# Patient Record
Sex: FEMALE | URBAN - METROPOLITAN AREA
[De-identification: names, ages, dates, MRNs, and addresses within clinical notes are randomized per-mention and may not be internally consistent; named-entity substitution may affect disease eponyms.]

---

## 2017-11-28 DIAGNOSIS — E03.9 HYPOTHYROIDISM, UNSPECIFIED TYPE: ICD-10-CM

## 2017-11-28 NOTE — LETTER
[facility]  [address] [id number] [phone]            Carine Moy  [address] [facility] [address]        November 29, 2017    Dear Carine,    While refilling your prescription today, we noticed that you are due for an appointment with your provider.  We will refill your prescription for 30 days, but a follow-up appointment must be made before any additional refills can be approved.     Taking care of your health is important to us and we look forward to seeing you in the near future.  Please call us at [phone] or [phone] (or use "[employer]") to schedule an appointment.     Please disregard this notice if you have already made an appointment.    Sincerely,        [facility]

## 2017-11-29 RX ORDER — LEVOTHYROXINE SODIUM 75 UG/1
TABLET ORAL
Qty: 30 TABLET | Refills: 0 | Status: SHIPPED | OUTPATIENT
Start: 2017-11-29 | End: 2017-12-21

## 2017-11-29 NOTE — TELEPHONE ENCOUNTER
Medication is being filled for 1 time refill only due to:  Patient needs to be seen because it has been more than one year since last visit.  Letter sent

## 2017-12-21 DIAGNOSIS — E03.9 HYPOTHYROIDISM, UNSPECIFIED TYPE: ICD-10-CM

## 2017-12-21 RX ORDER — LEVOTHYROXINE SODIUM 75 UG/1
TABLET ORAL
Qty: 30 TABLET | Refills: 0 | Status: SHIPPED | OUTPATIENT
Start: 2017-12-21 | End: 2017-12-24

## 2017-12-22 ENCOUNTER — OFFICE VISIT (OUTPATIENT)
Dept: INTERNAL MEDICINE | Facility: CLINIC | Age: 42
End: 2017-12-22
Payer: COMMERCIAL

## 2017-12-22 VITALS
TEMPERATURE: 98.7 F | SYSTOLIC BLOOD PRESSURE: 134 MMHG | HEIGHT: 61 IN | BODY MASS INDEX: 27.64 KG/M2 | OXYGEN SATURATION: 100 % | WEIGHT: 146.4 LBS | DIASTOLIC BLOOD PRESSURE: 80 MMHG | HEART RATE: 72 BPM

## 2017-12-22 DIAGNOSIS — E03.9 HYPOTHYROIDISM, UNSPECIFIED TYPE: Primary | ICD-10-CM

## 2017-12-22 LAB
T4 FREE SERPL-MCNC: 1.28 NG/DL (ref 0.76–1.46)
TSH SERPL DL<=0.005 MIU/L-ACNC: 0.71 MU/L (ref 0.4–4)

## 2017-12-22 PROCEDURE — 84443 ASSAY THYROID STIM HORMONE: CPT | Performed by: INTERNAL MEDICINE

## 2017-12-22 PROCEDURE — 84439 ASSAY OF FREE THYROXINE: CPT | Performed by: INTERNAL MEDICINE

## 2017-12-22 PROCEDURE — 36415 COLL VENOUS BLD VENIPUNCTURE: CPT | Performed by: INTERNAL MEDICINE

## 2017-12-22 PROCEDURE — 99213 OFFICE O/P EST LOW 20 MIN: CPT | Performed by: INTERNAL MEDICINE

## 2017-12-22 NOTE — NURSING NOTE
"Chief Complaint   Patient presents with     Thyroid Problem     Follow up and refill on levo.       Initial /80 (BP Location: Left arm, Patient Position: Chair)  Pulse 72  Temp 98.7  F (37.1  C) (Oral)  Ht 5' 1.3\" (1.557 m)  Wt 146 lb 6.4 oz (66.4 kg)  LMP 11/29/2017 (Exact Date)  SpO2 100%  BMI 27.39 kg/m2 Estimated body mass index is 27.39 kg/(m^2) as calculated from the following:    Height as of this encounter: 5' 1.3\" (1.557 m).    Weight as of this encounter: 146 lb 6.4 oz (66.4 kg).  Medication Reconciliation: complete     Kaminibose MA      "

## 2017-12-22 NOTE — PROGRESS NOTES
"  SUBJECTIVE:                                                      HPI: Carine Schumacher is a pleasant 42 year old female who presents for hypothyroidism follow-up:     utilized.    HYPOTHYROID FOLLOW-UP                                                      Current regimen: 75 mcg daily.  Adherent to regimen: Yes  Taking on an empty stomach with sip of water - do food, drink, supplements, or medications for at least 45 minutes: Yes    Cold or heat intolerance: Denies  Weight gain or loss: Denies  Hair loss or skin changes (dry or oily): Denies  Excessive fatigue: Denies  Constipation or diarrhea: Denies  Mood changes (anxiety or depression): Denies    ---    Patient is also DUE for flu shot - declines.    Patient is also DUE for Pap smear - will return at a future date to have this performed.    ---    The medication, allergy, and problem lists have been reviewed and updated as appropriate.       OBJECTIVE:                                                      /80 (BP Location: Left arm, Patient Position: Chair)  Pulse 72  Temp 98.7  F (37.1  C) (Oral)  Ht 5' 1.3\" (1.557 m)  Wt 146 lb 6.4 oz (66.4 kg)  LMP 11/29/2017 (Exact Date)  SpO2 100%  BMI 27.39 kg/m2  Constitutional: well-appearing  Psych: normal judgment and insight; normal mood and affect; recent and remote memory intact      ASSESSMENT/PLAN:                                                      (E03.9) Hypothyroidism, unspecified type  (primary encounter diagnosis)  Plan: TFTs - today refills to follow.    The instructions on the AVS were discussed and explained to the patient. Patient expressed understanding of instructions.    (Chart documentation was completed, in part, with [employer] voice-recognition software. Even though reviewed, some grammatical, spelling, and word errors may remain.)    Jumana Cui MD   [address] [address]  T: [phone], F: " [phone]

## 2017-12-22 NOTE — LETTER
12/24/17      Carine Schumacher  [address] [facility] [address]        Dear Ms.Lara Schumacher,    It was a pleasure seeing you again the other day! I hope this finds you well.    I wanted to let you know that your thyroid labs came back as normal.    Based on your results, I have sent a refill for levothyroxine to your pharmacy,     Please feel free to contact me with any questions or concerns.      Take care,    Jumana Cui MD   [place] [address] [place] [address]  T: [phone], F: [phone]

## 2017-12-22 NOTE — MR AVS SNAPSHOT
"              After Visit Summary   2017    Carine Schumacher    MRN: [id number]           Patient Information     Date Of Birth          1975        Visit Information        Provider Department      2017 10:00 AM [facility]umana Cui [facility]        Today's Diagnoses     Hypothyroidism, unspecified type    -  1      Care Instructions    Please schedule pap smear on the way out!    --    Labs today - refills to follow.           Follow-ups after your visit        Who to contact     If you have questions or need follow up information about today's clinic visit or your schedule please contact [facility] directly at [phone].  Normal or non-critical lab and imaging results will be communicated to you by MyChart, letter or phone within 4 business days after the clinic has received the results. If you do not hear from us within 7 days, please contact the clinic through MyChart or phone. If you have a critical or abnormal lab result, we will notify you by phone as soon as possible.  Submit refill requests through [employer] or call your pharmacy and they will forward the refill request to us. Please allow 3 business days for your refill to be completed.          Additional Information About Your Visit        MyChart Information     [employer] lets you send messages to your doctor, view your test results, renew your prescriptions, schedule appointments and more. To sign up, go to [email or web address] . Click on \"Log in\" on the left side of the screen, which will take you to the Welcome page. Then click on \"Sign up Now\" on the right side of the page.     You will be asked to enter the access code listed below, as well as some personal information. Please follow the directions to create your username and password.     Your access code is: [email or web address]  Expires: 3/22/2018 10:19 AM     Your access code will  in 90 days. If you need help or " "a new code, please call your [place] clinic or [phone].        Care EveryWhere ID     This is your Care EveryWhere ID. This could be used by other organizations to access your [place] medical records  [id number]        Your Vitals Were     Pulse Temperature Height Last Period Pulse Oximetry BMI (Body Mass Index)    72 98.7  F (37.1  C) (Oral) 5' 1.3\" (1.557 m) 11/29/2017 (Exact Date) 100% 27.39 kg/m2       Blood Pressure from Last 3 Encounters:   12/22/17 134/80   11/18/16 118/70    Weight from Last 3 Encounters:   12/22/17 146 lb 6.4 oz (66.4 kg)   11/18/16 144 lb 4.8 oz (65.5 kg)              We Performed the Following     T4 free     TSH        Primary Care Provider Office Phone # Fax #    Jumana Cui [phone]       [address] [facility] [address]        Equal Access to Services     MIKAELA [facility] HospitalELIANA : Hadii aad ku hadasho Soomaali, waaxda luqadaha, qaybta kaalmada adeegyada, waxay shruthiin haydeepak kaur . So [facility] [phone].    ATENCIÓN: Si habla español, tiene a leigh disposición servicios gratuitos de asistencia lingüística. Llame al [phone].    We comply with applicable federal civil rights laws and [place] laws. We do not discriminate on the basis of race, color, national origin, age, disability, sex, sexual orientation, or gender identity.            Thank you!     Thank you for choosing [facility]  for your care. Our goal is always to provide you with excellent care. Hearing back from our patients is one way we can continue to improve our services. Please take a few minutes to complete the written survey that you may receive in the mail after your visit with us. Thank you!             Your Updated Medication List - Protect others around you: Learn how to safely use, store and throw away your medicines at www.disposemymeds.org.          This list is accurate as of: 12/22/17 10:19 AM.  Always use your most recent med list.          "          Brand Name Dispense Instructions for use Diagnosis    levothyroxine 75 MCG tablet    SYNTHROID/LEVOTHROID    30 tablet    TAKE 1 TABLET BY MOUTH ONCE DAILY    Hypothyroidism, unspecified type

## 2017-12-24 DIAGNOSIS — E03.9 HYPOTHYROIDISM, UNSPECIFIED TYPE: ICD-10-CM

## 2017-12-24 RX ORDER — LEVOTHYROXINE SODIUM 75 UG/1
75 TABLET ORAL DAILY
Qty: 90 TABLET | Refills: 3 | Status: SHIPPED | OUTPATIENT
Start: 2017-12-24 | End: 2018-12-13

## 2018-01-07 ENCOUNTER — HEALTH MAINTENANCE LETTER (OUTPATIENT)
Age: 43
End: 2018-01-07

## 2018-02-16 ENCOUNTER — OFFICE VISIT (OUTPATIENT)
Dept: INTERNAL MEDICINE | Facility: CLINIC | Age: 43
End: 2018-02-16
Payer: COMMERCIAL

## 2018-02-16 VITALS
DIASTOLIC BLOOD PRESSURE: 80 MMHG | HEART RATE: 70 BPM | OXYGEN SATURATION: 100 % | WEIGHT: 148.4 LBS | HEIGHT: 61 IN | BODY MASS INDEX: 28.02 KG/M2 | SYSTOLIC BLOOD PRESSURE: 122 MMHG | TEMPERATURE: 98.2 F

## 2018-02-16 DIAGNOSIS — J31.0 CHRONIC RHINITIS, UNSPECIFIED TYPE: Primary | ICD-10-CM

## 2018-02-16 DIAGNOSIS — H11.31 SUBCONJUNCTIVAL HEMORRHAGE OF RIGHT EYE: ICD-10-CM

## 2018-02-16 DIAGNOSIS — R09.81 NASAL CONGESTION: ICD-10-CM

## 2018-02-16 DIAGNOSIS — H10.13 ALLERGIC CONJUNCTIVITIS, BILATERAL: ICD-10-CM

## 2018-02-16 PROCEDURE — 99213 OFFICE O/P EST LOW 20 MIN: CPT | Performed by: PHYSICIAN ASSISTANT

## 2018-02-16 RX ORDER — FLUTICASONE PROPIONATE 50 MCG
1-2 SPRAY, SUSPENSION (ML) NASAL DAILY
Qty: 1 BOTTLE | Refills: 11 | Status: SHIPPED | OUTPATIENT
Start: 2018-02-16 | End: 2019-04-12

## 2018-02-16 NOTE — MR AVS SNAPSHOT
After Visit Summary   2/16/2018    Carine Schumacher    MRN: [id number]           Patient Information     Date Of Birth          1975        Visit Information        Provider Department      2/16/2018 12:45 PM Lucero Cox PA-C; [facility]        Today's Diagnoses     Chronic rhinitis, unspecified type    -  1    Allergic conjunctivitis, bilateral        Subconjunctival hemorrhage of right eye        Nasal congestion          Care Instructions      Conjuntivitis, Alérgica [Conjunctivitis, Allergic]    La conjuntivitis alérgica (Allergic Conjunctivitis) es yaneth reacción al polvo o el polen que pudiese anil en el aire. Provoca comezón y enrojecimiento en las membranas de los párpados. También puede presentarse hinchazón de los párpados, enrojecimiento y la sensación tener arena o algo áspero dentro del jarrod.  Cuidados En [place]:  1. Es posible que le receten gotas para los ojos (eye drops) a fin de aliviar la comezón y el enrojecimiento. Úselas según le hayan indicado. Si no le recetaron gotas, puede usar Visine, Vasocon o algunas otras gotas descongestionantes para los ojos (decongestant eye drops), que leigh ann de venta sin receta.  2. Coloque yaneth compresa fría (toalla empapada en agua fría) sobre el jarrod afectado de 3 a 4 veces al día para aliviar la hinchazón y la comezón.  3. Es común que tenga secreción de mucosidad mikaela la noche, lo que hace que los párpados tengan costras por la mañana. Emplee yaneth yola humedecida con agua tibia para quitar las costras. También puede irrigar los ojos con yaneth solución salina (saline solution) o emplear lágrimas artificiales (artificial tears) para limpiar la mucosidad que pudiese anil dentro del jarrod. No tape el jarrod con yaneth venda.  4. Puede usar acetaminofén [acetaminophen] (Tylenol) o ibuprofeno [ibuprofen] (Motrin o Advil) para controlar el dolor, a menos que le hayan recetado otro medicamento. [ NOTA: Si  tiene yaneth enfermedad hepática o renal crónica (chronic liver or kidney disease), o ha tenido alguna vez yaneth úlcera estomacal (stomach ulcer) o sangrado gastrointestinal (GI bleeding), consulte con leigh médico antes de paolo estos medicamentos.]  5. No use lentes de contacto hasta que dmitry ojos hayan sanado y todos los síntomas hayan desaparecido.  Programe yaneth VISITA DE CONTROL con leigh médico o davon centro según le indiquen, o si no ha nicki en los próximos néstor días.  Busque Prontamente Atención Médica  si algo de lo siguiente ocurre:    Mayor hinchazón del párpado.    Supuración de líquido del jarrod, nueva o que empeora.    Mayor enrojecimiento alrededor del jarrod.    Hinchazón de la mireille.  Date Last Reviewed: 6/14/2015 2000-2017 [employer]. [address] [place] [id number]. Todos los derechos reservados. Esta información no pretende sustituir la atención médica profesional. Sólo leigh médico puede diagnosticar y tratar un problema de gulshan.        Hemorragia subconjuntival    Esta afección se produce cuando se rompe un vaso sanguíneo de la parte jose alfredo del jarrod. Causa yaneth pedro de color graff brillante en la parte jose alfredo del jarrod. Grenelefe es parecido a cuando se produce un moretón en la piel. Es un tipo de hemorragia (sangrado) común. Puede verse bastante alarmante, janna generalmente no es dañina.   Qué es la conjuntiva?  Es la capa delgada que cubre la parte interior de los párpados y la superficie del jarrod. Tiene muchos vasos sanguíneos muy pequeños que llevan oxígeno y nutrientes al jarrod. La esclerótica es la parte jose alfredo del jarrod que está debajo de la conjuntiva. A veces un vaso sanguíneo de la conjuntiva se rompe y sangra. Entonces la giancarlo se acumula debajo de la conjuntiva y yaneth parte del jarrod se pone cielo. Luego de varias semanas, leigh cuerpo habrá absorbido la giancarlo.   Cuáles son las causas de yaneth hemorragia subconjuntival?  En muchos casos, no se detecta la causa. Sin embargo, algunas  afecciones hacen más probable que se produzca davon sangrado. Por ejemplo:    Lesiones en los ojos    Cirugía de jarrod    Presión arterial delilah    Inflamación de la conjuntiva    Uso de lentes de contacto    Diabetes    Arterioesclerosis    Tumor en la conjuntiva    Enfermedades que afectan la coagulación de la giancarlo    Vómitos, tos o estornudo violentos    Ciertos medicamentos que pueden aumentar el sangrado, mikhail la aspirina    Pujar (hacer fuerza) muy walter mikaela el parto    Esforzarse por causa de estreñimiento  Síntomas de hemorragia subconjuntival  El síntoma principal es yaneth pedro cielo en el jarrod. Probablemente usted la note después de levantarse a la mañana. En la mayoría de los casos, solamente un jarrod presentará sangrado. En general, sucede yaneth vez y desaparece. Sin embargo, algunas afecciones pueden causar hemorragias repetidas. Es posible que usted sienta mikhail si tuviera algo en leigh jarrod, janna esta sensación no es común. La hemorragia no debería afectar leigh visión ni causarle ningún dolor. En mario alberto de que sí tenga dolor, es posible que tenga otro tipo de problema en leigh jarrod.  Diagnóstico de la hemorragia subconjuntival  Leigh proveedor de atención médica le preguntará sobre leigh historia clínica (antecedentes de gulshan). Es posible que le mae un examen físico. que incluirá un examen básico de los ojos. Leigh proveedor de atención médica se asegurará de que usted no tenga otras causas de sangrado en el jarrod que puedan necesitar otro tratamiento.  Usted tendrá que consultar a un médico de la visión (oftalmólogo) si tiene yaneth lesión en el jarrod. Davon médico podría usar un microscopio especial con karmen para observar de cerca leigh jarrod. Davon instrumento le ayuda al médico a saber si la herida lesionó el jarrod en sí mismo y no solo leigh capa exterior.  Si esta no es leigh primera hemorragia subconjuntival, es posible que sea necesario que leigh médico detecte la causa. Por ejemplo, allison vez usted necesite hacerse análisis de giancarlo para  saber si tiene algún trastorno de coagulación de la giancarlo.  Tratamiento de la hemorragia subconjuntival  En la mayoría de los casos, usted no necesitará tratamiento. La pedro cielo generalmente desaparecerá por sí misma en algunas semanas. Pasará de cielo a marrón y luego se tornará de color amarillo. No existen tratamiento para acelerar davon proceso. Es posible que leigh médico le sugiera que se coloque yaneth compresa tibia y lágrimas artificiales (gotas) para ayudar a aliviar un poco el enrojecimiento.  Si yaneth enfermedad causó leigh hemorragia subconjuntival, se tratará yesika afección. Por ejemplo, es posible que usted necesite un medicamento para tratar la presión arterial delilah.     Cuándo llamar a leigh proveedor de atención médica  Llame enseguida a leigh proveedor de atención médica si tiene alguno de los siguientes síntomas:    Hemorragia que no desaparece en dos a kennedy semanas    Dolor de jarrod    Pérdida de la visión    Otra hemorragia subconjuntival    Date Last Reviewed: 5/20/2015 2000-2017 [employer]. [address] [place] [address]. All rights reserved. This information is not intended as a substitute for professional medical care. Always follow your healthcare professional's instructions.                Follow-ups after your visit        Who to contact     If you have questions or need follow up information about today's clinic visit or your schedule please contact [facility] directly at [phone].  Normal or non-critical lab and imaging results will be communicated to you by MyChart, letter or phone within 4 business days after the clinic has received the results. If you do not hear from us within 7 days, please contact the clinic through MyChart or phone. If you have a critical or abnormal lab result, we will notify you by phone as soon as possible.  Submit refill requests through [employer] or call your pharmacy and they will forward the refill request to us.  "Please allow 3 business days for your refill to be completed.          Additional Information About Your Visit        MyChart Information     [employer] lets you send messages to your doctor, view your test results, renew your prescriptions, schedule appointments and more. To sign up, go to [email or web address] . Click on \"Log in\" on the left side of the screen, which will take you to the Welcome page. Then click on \"Sign up Now\" on the right side of the page.     You will be asked to enter the access code listed below, as well as some personal information. Please follow the directions to create your username and password.     Your access code is: [email or web address]  Expires: 3/22/2018 10:19 AM     Your access code will  in 90 days. If you need help or a new code, please call your [place] clinic or [phone].        Care EveryWhere ID     This is your Care EveryWhere ID. This could be used by other organizations to access your [place] medical records  [id number]        Your Vitals Were     Pulse Temperature Height Last Period Pulse Oximetry BMI (Body Mass Index)    70 98.2  F (36.8  C) (Oral) 5' 1.3\" (1.557 m) 2018 100% 27.77 kg/m2       Blood Pressure from Last 3 Encounters:   18 122/80   17 134/80   16 118/70    Weight from Last 3 Encounters:   18 148 lb 6.4 oz (67.3 kg)   17 146 lb 6.4 oz (66.4 kg)   16 144 lb 4.8 oz (65.5 kg)              Today, you had the following     No orders found for display         Today's Medication Changes          These changes are accurate as of 18  1:21 PM.  If you have any questions, ask your nurse or doctor.               Start taking these medicines.        Dose/Directions    fluticasone 50 MCG/ACT spray   Commonly known as:  FLONASE   Used for:  Chronic rhinitis, unspecified type   Started by:  Lucero Cox PA-C        Dose:  1-2 spray   Spray 1-2 sprays into both nostrils daily   Quantity:  1 Bottle   Refills:  11 "       ketotifen 0.025 % Soln ophthalmic solution   Commonly known as:  ZADITOR   Used for:  Allergic conjunctivitis, bilateral   Started by:  Lucero Cox PA-C        Dose:  1 drop   Place 1 drop into both eyes every 12 hours   Quantity:  1 Bottle   Refills:  3            Where to get your medicines      These medications were sent to [place] [address] [place] [facility] - [place], [place] - [address]  [address], [place] [address]     Phone:  [phone]     fluticasone 50 MCG/ACT spray    ketotifen 0.025 % Soln ophthalmic solution                Primary Care Provider Office Phone # Fax #    Jumana Cui [phone]       [address] [facility] [address]        Equal Access to Services     KATIUSKA BHAKTA AH: Hadii mallory ku hadasho Soomaali, waaxda luqadaha, qaybta kaalmada adeegyada, waxay smiley hayaan hernan kaur . So [facility] [phone].    ATENCIÓN: Si habla español, tiene a leigh disposición servicios gratuitos de asistencia lingüística. Llame al [phone].    We comply with applicable federal civil rights laws and [place] laws. We do not discriminate on the basis of race, color, national origin, age, disability, sex, sexual orientation, or gender identity.            Thank you!     Thank you for choosing [facility]  for your care. Our goal is always to provide you with excellent care. Hearing back from our patients is one way we can continue to improve our services. Please take a few minutes to complete the written survey that you may receive in the mail after your visit with us. Thank you!             Your Updated Medication List - Protect others around you: Learn how to safely use, store and throw away your medicines at www.disposemymeds.org.          This list is accurate as of 2/16/18  1:21 PM.  Always use your most recent med list.                   Brand Name Dispense Instructions for use Diagnosis    fluticasone 50 MCG/ACT spray    FLONASE    1  Bottle    Spray 1-2 sprays into both nostrils daily    Chronic rhinitis, unspecified type       ketotifen 0.025 % Soln ophthalmic solution    ZADITOR    1 Bottle    Place 1 drop into both eyes every 12 hours    Allergic conjunctivitis, bilateral       levothyroxine 75 MCG tablet    SYNTHROID/LEVOTHROID    90 tablet    Take 1 tablet (75 mcg) by mouth daily    Hypothyroidism, unspecified type

## 2018-02-16 NOTE — PATIENT INSTRUCTIONS
Conjuntivitis, Alérgica [Conjunctivitis, Allergic]    La conjuntivitis alérgica (Allergic Conjunctivitis) es yaneth reacción al polvo o el polen que pudiese anil en el aire. Provoca comezón y enrojecimiento en las membranas de los párpados. También puede presentarse hinchazón de los párpados, enrojecimiento y la sensación tener arena o algo áspero dentro del jarrod.  Cuidados En [place]:  1. Es posible que le receten gotas para los ojos (eye drops) a fin de aliviar la comezón y el enrojecimiento. Úselas según le hayan indicado. Si no le recetaron gotas, puede usar Visine, Vasocon o algunas otras gotas descongestionantes para los ojos (decongestant eye drops), que leigh ann de venta sin receta.  2. Coloque yaneth compresa fría (toalla empapada en agua fría) sobre el jarrod afectado de 3 a 4 veces al día para aliviar la hinchazón y la comezón.  3. Es común que tenga secreción de mucosidad mikaela la noche, lo que hace que los párpados tengan costras por la mañana. Emplee yaneth yola humedecida con agua tibia para quitar las costras. También puede irrigar los ojos con yaneth solución salina (saline solution) o emplear lágrimas artificiales (artificial tears) para limpiar la mucosidad que pudiese anil dentro del jarrod. No tape el jarrod con yaneth venda.  4. Puede usar acetaminofén [acetaminophen] (Tylenol) o ibuprofeno [ibuprofen] (Motrin o Advil) para controlar el dolor, a menos que le hayan recetado otro medicamento. [ NOTA: Si tiene yaneth enfermedad hepática o renal crónica (chronic liver or kidney disease), o ha tenido alguna vez yaneth úlcera estomacal (stomach ulcer) o sangrado gastrointestinal (GI bleeding), consulte con leigh médico antes de paolo estos medicamentos.]  5. No use lentes de contacto hasta que dmitry ojos hayan sanado y todos los síntomas hayan desaparecido.  Programe yaneth VISITA DE CONTROL con leigh médico o davon centro según le indiquen, o si no ha nicki en los próximos néstor días.  Busque Prontamente Atención Médica  si algo de lo  siguiente ocurre:    Mayor hinchazón del párpado.    Supuración de líquido del jarrod, nueva o que empeora.    Mayor enrojecimiento alrededor del jarrod.    Hinchazón de la mireille.  Date Last Reviewed: 6/14/2015 2000-2017 [employer]. [address] [place] [address], [place] [address]. Todos los derechos reservados. Esta información no pretende sustituir la atención médica profesional. Sólo leigh médico puede diagnosticar y tratar un problema de gulshan.        Hemorragia subconjuntival    Esta afección se produce cuando se rompe un vaso sanguíneo de la parte jose alfredo del jarrod. Causa yaneth pedro de color graff brillante en la parte jose alfredo del jarrod. [place] es parecido a cuando se produce un moretón en la piel. Es un tipo de hemorragia (sangrado) común. Puede verse bastante alarmante, janna generalmente no es dañina.   Qué es la conjuntiva?  Es la capa delgada que cubre la parte interior de los párpados y la superficie del jarrod. Tiene muchos vasos sanguíneos muy pequeños que llevan oxígeno y nutrientes al jarrod. La esclerótica es la parte jose alfredo del jarrod que está debajo de la conjuntiva. A veces un vaso sanguíneo de la conjuntiva se rompe y sangra. Entonces la giancarlo se acumula debajo de la conjuntiva y yaneth parte del jarrod se pone cielo. Luego de varias semanas, leigh cuerpo habrá absorbido la giancarlo.   Cuáles son las causas de yaneth hemorragia subconjuntival?  En muchos casos, no se detecta la causa. Sin embargo, algunas afecciones hacen más probable que se produzca davon sangrado. Por ejemplo:    Lesiones en los ojos    Cirugía de jarrod    Presión arterial delilah    Inflamación de la conjuntiva    Uso de lentes de contacto    Diabetes    Arterioesclerosis    Tumor en la conjuntiva    Enfermedades que afectan la coagulación de la giancarlo    Vómitos, tos o estornudo violentos    Ciertos medicamentos que pueden aumentar el sangrado, mikhail la aspirina    Pujar (hacer fuerza) muy walter mikaela el parto    Esforzarse por causa de estreñimiento  Síntomas  de hemorragia subconjuntival  El síntoma principal es yaneth pedro cielo en el jarrod. Probablemente usted la note después de levantarse a la mañana. En la mayoría de los casos, solamente un jarrod presentará sangrado. En general, sucede yaneth vez y desaparece. Sin embargo, algunas afecciones pueden causar hemorragias repetidas. Es posible que usted sienta mikhail si tuviera algo en leigh jarrod, janna esta sensación no es común. La hemorragia no debería afectar leigh visión ni causarle ningún dolor. En mario alberto de que sí tenga dolor, es posible que tenga otro tipo de problema en leigh jarrod.  Diagnóstico de la hemorragia subconjuntival  Leigh proveedor de atención médica le preguntará sobre leigh historia clínica (antecedentes de gulshan). Es posible que le mae un examen físico. que incluirá un examen básico de los ojos. Leigh proveedor de atención médica se asegurará de que usted no tenga otras causas de sangrado en el jarrod que puedan necesitar otro tratamiento.  Usted tendrá que consultar a un médico de la visión (oftalmólogo) si tiene yaneth lesión en el jarrod. Davon médico podría usar un microscopio especial con karmen para observar de cerca leigh jarrod. Davon instrumento le ayuda al médico a saber si la herida lesionó el jarrod en sí mismo y no solo leigh capa exterior.  Si esta no es leigh primera hemorragia subconjuntival, es posible que sea necesario que leigh médico detecte la causa. Por ejemplo, allison vez usted necesite hacerse análisis de giancarlo para saber si tiene algún trastorno de coagulación de la giancarlo.  Tratamiento de la hemorragia subconjuntival  En la mayoría de los casos, usted no necesitará tratamiento. La pedro cielo generalmente desaparecerá por sí misma en algunas semanas. Pasará de cielo a marrón y luego se tornará de color amarillo. No existen tratamiento para acelerar davon proceso. Es posible que leigh médico le sugiera que se coloque yaneth compresa tibia y lágrimas artificiales (gotas) para ayudar a aliviar un poco el enrojecimiento.  Si yaneth enfermedad causó leigh  hemorragia subconjuntival, se tratará yesika afección. Por ejemplo, es posible que usted necesite un medicamento para tratar la presión arterial delilah.     Cuándo llamar a leigh proveedor de atención médica  Llame enseguida a leigh proveedor de atención médica si tiene alguno de los siguientes síntomas:    Hemorragia que no desaparece en dos a kennedy semanas    Dolor de jarrod    Pérdida de la visión    Otra hemorragia subconjuntival    Date Last Reviewed: 5/20/2015 2000-2017 [employer]. [address] [place] [address], [place] [address]. All rights reserved. This information is not intended as a substitute for professional medical care. Always follow your healthcare professional's instructions.

## 2018-02-16 NOTE — PROGRESS NOTES
"  SUBJECTIVE:   Carine Schumacher is a 42 year old female who presents to clinic today for the following health issues:      Due to language barrier, an  was present during the history-taking and subsequent discussion (and for part of the physical exam) with this patient.    Eye(s) Problem  Onset: since am     Description:   Location: right  Pain: no   Redness: YES    Accompanying Signs & Symptoms:  Discharge/mattering: no   Swelling: no   Visual changes: no   Fever: no   Nasal Congestion: no   Bothered by bright lights: no     History:   Trauma: no   Foreign body exposure: no     Precipitating factors:   Wearing contacts: no     Alleviating factors:  Improved by: nothing   Nasal congestion for several years- clear. Runny nose with itching of the nose and sneezing often.  States eyes with irritation as well.     No otc tried for symptoms.       Therapies Tried and outcome: nothing     -------------------------------------    Problem list and histories reviewed & adjusted, as indicated.  Additional history: as documented    Labs reviewed in EPIC    Reviewed and updated as needed this visit by clinical staff  Tobacco  Allergies  Meds       Reviewed and updated as needed this visit by Provider  Allergies  Meds         ROS:  Constitutional, HEENT, cardiovascular, pulmonary, gi and gu systems are negative, except as otherwise noted.    OBJECTIVE:     /80 (BP Location: Left arm, Patient Position: Chair, Cuff Size: Adult Regular)  Pulse 70  Temp 98.2  F (36.8  C) (Oral)  Ht 5' 1.3\" (1.557 m)  Wt 148 lb 6.4 oz (67.3 kg)  LMP 02/02/2018  SpO2 100%  BMI 27.77 kg/m2  Body mass index is 27.77 kg/(m^2).  GENERAL: healthy, alert and no distress  Eyes: subconjunctivae hemorrhage later right eye.   PERRLA   EOM's intact  HENT: normal cephalic/atraumatic, ear canals and TM's normal, nose and mouth without ulcers or lesions, nasal mucosa pale boggy , rhinorrhea clear, oropharynx clear and oral mucous " membranes moist  NECK: no adenopathy, no asymmetry, masses, or scars and thyroid normal to palpation  RESP: lungs clear to auscultation - no rales, rhonchi or wheezes  CV: regular rate and rhythm, normal S1 S2, no S3 or S4, no murmur, click or rub, no peripheral edema and peripheral pulses strong  SKIN: no suspicious lesions or rashes    Diagnostic Test Results:  none     ASSESSMENT/PLAN:             1. Chronic rhinitis, unspecified type    - fluticasone (FLONASE) 50 MCG/ACT spray; Spray 1-2 sprays into both nostrils daily  Dispense: 1 Bottle; Refill: 11    2. Allergic conjunctivitis, bilateral    - ketotifen (ZADITOR) 0.025 % SOLN ophthalmic solution; Place 1 drop into both eyes every 12 hours  Dispense: 1 Bottle; Refill: 3    3. Subconjunctival hemorrhage of right eye      4. Nasal congestion    Reviewed treatment  Patient instructions in Portuguese           Lucero Cox PA-C  [facility]

## 2018-12-13 DIAGNOSIS — E03.9 HYPOTHYROIDISM, UNSPECIFIED TYPE: ICD-10-CM

## 2018-12-13 RX ORDER — LEVOTHYROXINE SODIUM 75 UG/1
75 TABLET ORAL DAILY
Qty: 90 TABLET | Refills: 0 | Status: SHIPPED | OUTPATIENT
Start: 2018-12-13 | End: 2019-04-13

## 2018-12-13 NOTE — LETTER
[facility]  [address] [id number] [phone]            Carine Schumacher  [address] [facility] [address]        December 13, 2018    Dear Carine,    While refilling your prescription today, we noticed that you are due to have labs drawn.  We will refill your prescription for 30 days, but a follow-up appointment must be made before any additional refills can be approved.     Taking care of your health is important to us and we look forward to seeing you in the near future.  Please call us at [phone] or [phone] (or use [employer]) to schedule an appointment.     Please disregard this notice if you have already made an appointment.    Sincerely,        [facility]

## 2018-12-13 NOTE — TELEPHONE ENCOUNTER
"Requested Prescriptions   Pending Prescriptions Disp Refills     levothyroxine (SYNTHROID/LEVOTHROID) 75 MCG tablet [Pharmacy Med Name: LEVOTHYROXINE 75 MCG TABLET] 90 tablet 3     Sig: TAKE 1 TABLET (75 MCG) BY MOUTH DAILY    Thyroid Protocol Passed - 12/13/2018  5:24 AM       Passed - Patient is 12 years or older       Passed - Recent (12 mo) or future (30 days) visit within the authorizing provider's specialty    Patient had office visit in the last 12 months or has a visit in the next 30 days with authorizing provider or within the authorizing provider's specialty.  See \"Patient Info\" tab in inbasket, or \"Choose Columns\" in Meds & Orders section of the refill encounter.             Passed - Normal TSH on file in past 12 months    Recent Labs   Lab Test 12/22/17  1022   TSH 0.71             Passed - No active pregnancy on record    If patient is pregnant or has had a positive pregnancy test, please check TSH.         Passed - No positive pregnancy test in past 12 months    If patient is pregnant or has had a positive pregnancy test, please check TSH.          Last Written Prescription Date:  12/24/2017  Last Fill Quantity: 90,  # refills: 3   Last office visit: 2/16/2018 with prescribing provider:  Dr Cui   Future Office Visit:      "

## 2018-12-18 ENCOUNTER — PATIENT OUTREACH (OUTPATIENT)
Dept: CARE COORDINATION | Facility: CLINIC | Age: 43
End: 2018-12-18

## 2018-12-18 NOTE — PROGRESS NOTES
CCC SW outreach call to patient with [place]  Services in follow-up to patient coverage program with [facility] ending 9/30/18.    Spoke with patient/family who stated they was aware of coverage ending, however was unaware of gap coverage through [facility] program for 2018.  [facility] shared [facility] program contact information for additional questions and assistance.    Alfa Frausto, [facility]  Clinic Care Coordinator  [facility]  [phone]  [email or web address]

## 2019-04-12 ENCOUNTER — OFFICE VISIT (OUTPATIENT)
Dept: INTERNAL MEDICINE | Facility: CLINIC | Age: 44
End: 2019-04-12

## 2019-04-12 VITALS
HEART RATE: 71 BPM | BODY MASS INDEX: 27.58 KG/M2 | DIASTOLIC BLOOD PRESSURE: 82 MMHG | WEIGHT: 147.4 LBS | OXYGEN SATURATION: 99 % | SYSTOLIC BLOOD PRESSURE: 122 MMHG

## 2019-04-12 DIAGNOSIS — Z13.220 SCREENING FOR CHOLESTEROL LEVEL: ICD-10-CM

## 2019-04-12 DIAGNOSIS — E03.9 HYPOTHYROIDISM, UNSPECIFIED TYPE: Primary | ICD-10-CM

## 2019-04-12 DIAGNOSIS — Z13.1 SCREENING FOR DIABETES MELLITUS: ICD-10-CM

## 2019-04-12 DIAGNOSIS — Z13.0 SCREENING FOR BLOOD DISEASE: ICD-10-CM

## 2019-04-12 DIAGNOSIS — Z11.4 SCREENING FOR HIV (HUMAN IMMUNODEFICIENCY VIRUS): ICD-10-CM

## 2019-04-12 LAB
ALBUMIN SERPL-MCNC: 3.9 G/DL (ref 3.4–5)
ALP SERPL-CCNC: 71 U/L (ref 40–150)
ALT SERPL W P-5'-P-CCNC: 22 U/L (ref 0–50)
ANION GAP SERPL CALCULATED.3IONS-SCNC: 5 MMOL/L (ref 3–14)
AST SERPL W P-5'-P-CCNC: 17 U/L (ref 0–45)
BILIRUB SERPL-MCNC: 0.4 MG/DL (ref 0.2–1.3)
BUN SERPL-MCNC: 15 MG/DL (ref 7–30)
CALCIUM SERPL-MCNC: 9.1 MG/DL (ref 8.5–10.1)
CHLORIDE SERPL-SCNC: 106 MMOL/L (ref 94–109)
CHOLEST SERPL-MCNC: 213 MG/DL
CO2 SERPL-SCNC: 27 MMOL/L (ref 20–32)
CREAT SERPL-MCNC: 0.93 MG/DL (ref 0.52–1.04)
ERYTHROCYTE [DISTWIDTH] IN BLOOD BY AUTOMATED COUNT: 11.4 % (ref 10–15)
GFR SERPL CREATININE-BSD FRML MDRD: 76 ML/MIN/{1.73_M2}
GLUCOSE SERPL-MCNC: 97 MG/DL (ref 70–99)
HCT VFR BLD AUTO: 40.6 % (ref 35–47)
HDLC SERPL-MCNC: 51 MG/DL
HGB BLD-MCNC: 14.2 G/DL (ref 11.7–15.7)
HIV 1+2 AB+HIV1 P24 AG SERPL QL IA: NONREACTIVE
LDLC SERPL CALC-MCNC: 146 MG/DL
MCH RBC QN AUTO: 33.1 PG (ref 26.5–33)
MCHC RBC AUTO-ENTMCNC: 35 G/DL (ref 31.5–36.5)
MCV RBC AUTO: 95 FL (ref 78–100)
NONHDLC SERPL-MCNC: 162 MG/DL
PLATELET # BLD AUTO: 246 10E9/L (ref 150–450)
POTASSIUM SERPL-SCNC: 4 MMOL/L (ref 3.4–5.3)
PROT SERPL-MCNC: 7.8 G/DL (ref 6.8–8.8)
RBC # BLD AUTO: 4.29 10E12/L (ref 3.8–5.2)
SODIUM SERPL-SCNC: 138 MMOL/L (ref 133–144)
T4 FREE SERPL-MCNC: 1.19 NG/DL (ref 0.76–1.46)
TRIGL SERPL-MCNC: 82 MG/DL
TSH SERPL DL<=0.005 MIU/L-ACNC: 1.49 MU/L (ref 0.4–4)
WBC # BLD AUTO: 7.1 10E9/L (ref 4–11)

## 2019-04-12 PROCEDURE — 36415 COLL VENOUS BLD VENIPUNCTURE: CPT | Performed by: INTERNAL MEDICINE

## 2019-04-12 PROCEDURE — 80061 LIPID PANEL: CPT | Performed by: INTERNAL MEDICINE

## 2019-04-12 PROCEDURE — 80053 COMPREHEN METABOLIC PANEL: CPT | Performed by: INTERNAL MEDICINE

## 2019-04-12 PROCEDURE — 84443 ASSAY THYROID STIM HORMONE: CPT | Performed by: INTERNAL MEDICINE

## 2019-04-12 PROCEDURE — 87389 HIV-1 AG W/HIV-1&-2 AB AG IA: CPT | Performed by: INTERNAL MEDICINE

## 2019-04-12 PROCEDURE — 99213 OFFICE O/P EST LOW 20 MIN: CPT | Performed by: INTERNAL MEDICINE

## 2019-04-12 PROCEDURE — 84439 ASSAY OF FREE THYROXINE: CPT | Performed by: INTERNAL MEDICINE

## 2019-04-12 PROCEDURE — 85027 COMPLETE CBC AUTOMATED: CPT | Performed by: INTERNAL MEDICINE

## 2019-04-12 NOTE — PROGRESS NOTES
SUBJECTIVE:                                                      HPI: Carine Schumacher is a pleasant 43 year old female who presents for hypothyroidism follow-up:     utilized.    HYPOTHYROID FOLLOW-UP                                                      Current regimen: levothyroxine 75 mcg daily  Adherent to regimen: yes  Taking on an empty stomach with sip of water - do food, drink, supplements, or medications for at least 45 minutes: yes    Cold or heat intolerance: denies  Weight gain or loss: denies  Hair loss or skin changes (dry or oily): denies  Excessive fatigue: denies  Constipation or diarrhea: denies  Mood changes (anxiety or depression): denies    ---    Patient is also due for cholesterol, diabetes, and HIV screens.    ---    The medication, allergy, and problem lists have been reviewed and updated as appropriate.     OBJECTIVE:                                                      /82   Pulse 71   Wt 66.9 kg (147 lb 6.4 oz)   LMP 04/05/2019 (Within Days)   SpO2 99%   BMI 27.58 kg/m    Constitutional: well-appearing  Psych: normal judgment and insight; normal mood and affect; recent and remote memory intact    ASSESSMENT/PLAN:                                                      (E03.9) Hypothyroidism, unspecified type  (primary encounter diagnosis)  Comment clinically doing well on levothyroxine 75 mcg daily.:   Plan: TFTs today; recommendations and refills to follow.    (Z11.4) Screening for HIV (human immunodeficiency virus)  (Z13.220) Screening for cholesterol level  (Z13.1) Screening for diabetes mellitus  (Z13.0) Screening for blood disease  Plan: fasting labs today.    The instructions on the AVS were discussed and explained to the patient. Patient expressed understanding of instructions.    (Chart documentation was completed, in part, with [employer] voice-recognition software. Even though reviewed, some grammatical, spelling, and word errors may remain.)    Jumana  MD See   [address] [address]  T: [phone], F: [phone]

## 2019-04-12 NOTE — LETTER
04/13/19      Carine Schumacher  [address]   [place] [address]        Dear Ms.Lara Schumacher,    It was a pleasure seeing you again the other day! I hope this finds you well.    I wanted to let you know that your labs came back.    Your cholesterol is elevated. Medication is not needed, but attention to a heart healthy diet, regular exercise, and maintaining a healthy weight are recommended and encouraged.    The remainder of your labs are within normal limits.     Please feel free to contact me with any questions or concerns.      Take care,    Jumana Cui MD   [place] [address] [place] [address]  T: [phone], F: [phone]

## 2019-04-13 DIAGNOSIS — E03.9 HYPOTHYROIDISM, UNSPECIFIED TYPE: ICD-10-CM

## 2019-04-13 RX ORDER — LEVOTHYROXINE SODIUM 75 UG/1
75 TABLET ORAL DAILY
Qty: 90 TABLET | Refills: 3 | Status: SHIPPED | OUTPATIENT
Start: 2019-04-13